# Patient Record
Sex: MALE | Race: BLACK OR AFRICAN AMERICAN | NOT HISPANIC OR LATINO
[De-identification: names, ages, dates, MRNs, and addresses within clinical notes are randomized per-mention and may not be internally consistent; named-entity substitution may affect disease eponyms.]

---

## 2020-06-19 ENCOUNTER — APPOINTMENT (OUTPATIENT)
Dept: CARDIOLOGY | Facility: CLINIC | Age: 50
End: 2020-06-19
Payer: COMMERCIAL

## 2020-06-23 PROCEDURE — 93224 XTRNL ECG REC UP TO 48 HRS: CPT

## 2020-06-25 ENCOUNTER — APPOINTMENT (OUTPATIENT)
Dept: CARDIOLOGY | Facility: CLINIC | Age: 50
End: 2020-06-25
Payer: COMMERCIAL

## 2020-06-25 VITALS
HEIGHT: 70 IN | HEART RATE: 85 BPM | OXYGEN SATURATION: 96 % | BODY MASS INDEX: 21.47 KG/M2 | WEIGHT: 150 LBS | DIASTOLIC BLOOD PRESSURE: 68 MMHG | SYSTOLIC BLOOD PRESSURE: 118 MMHG | TEMPERATURE: 98.8 F

## 2020-06-25 DIAGNOSIS — Z83.3 FAMILY HISTORY OF DIABETES MELLITUS: ICD-10-CM
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
## 2020-06-25 DIAGNOSIS — Z00.00 ENCOUNTER FOR GENERAL ADULT MEDICAL EXAMINATION W/OUT ABNORMAL FINDINGS: ICD-10-CM

## 2020-06-25 PROCEDURE — 99244 OFF/OP CNSLTJ NEW/EST MOD 40: CPT

## 2020-06-25 RX ORDER — PNV NO.95/FERROUS FUM/FOLIC AC 28MG-0.8MG
TABLET ORAL
Refills: 0 | Status: DISCONTINUED | COMMUNITY

## 2020-06-25 RX ORDER — FOLIC ACID 20 MG
CAPSULE ORAL
Refills: 0 | Status: DISCONTINUED | COMMUNITY

## 2020-06-25 NOTE — PHYSICAL EXAM
[General Appearance - Well Developed] : well developed [Normal Appearance] : normal appearance [General Appearance - Well Nourished] : well nourished [Well Groomed] : well groomed [General Appearance - In No Acute Distress] : no acute distress [No Deformities] : no deformities [Normal Conjunctiva] : the conjunctiva exhibited no abnormalities [Eyelids - No Xanthelasma] : the eyelids demonstrated no xanthelasmas [No Oral Cyanosis] : no oral cyanosis [No Oral Pallor] : no oral pallor [Normal Oral Mucosa] : normal oral mucosa [Normal Jugular Venous V Waves Present] : normal jugular venous V waves present [Heart Rate And Rhythm] : heart rate and rhythm were normal [Heart Sounds] : normal S1 and S2 [Arterial Pulses Normal] : the arterial pulses were normal [Murmurs] : no murmurs present [Edema] : no peripheral edema present [Respiration, Rhythm And Depth] : normal respiratory rhythm and effort [FreeTextEntry1] : No midsystolic click [Abdomen Soft] : soft [Exaggerated Use Of Accessory Muscles For Inspiration] : no accessory muscle use [Auscultation Breath Sounds / Voice Sounds] : lungs were clear to auscultation bilaterally [Abdomen Tenderness] : non-tender [Gait - Sufficient For Exercise Testing] : the gait was sufficient for exercise testing [Abnormal Walk] : normal gait [Skin Color & Pigmentation] : normal skin color and pigmentation [Cyanosis, Localized] : no localized cyanosis [Nail Clubbing] : no clubbing of the fingernails [] : no rash [Skin Turgor] : normal skin turgor [Oriented To Time, Place, And Person] : oriented to person, place, and time [Mood] : the mood was normal [Affect] : the affect was normal [No Anxiety] : not feeling anxious

## 2020-06-25 NOTE — ASSESSMENT
[FreeTextEntry1] : Reviewed today:\par -EKG 6/18/2020: Sinus rhythm, unremarkable.\par -Holter recording 6/19/2020: Sinus rhythm.  Average heart rate 89.  Rare PACs and rare PVCs.  No symptoms\par

## 2020-06-25 NOTE — HISTORY OF PRESENT ILLNESS
[FreeTextEntry1] : Jj is a pleasant 50-year-old male with history of anxiety and remote history of mitral valve prolapse.  In the past month, he has had 2 episodes of waking up from the sleep with hyperventilation and rapid palpitations. \par \par  On the last occasion about 3 to 4 weeks ago, he went to the ER where he was checked out and was told that his heart was okay.\par \par Past history of palpitations with lightheadedness or syncope.  He is fairly active and does not have exertional chest pain or shortness of breath.  He denies PND, sleep apnea, pedal edema\par \par He does not abuse drugs or alcohol

## 2020-06-25 NOTE — DISCUSSION/SUMMARY
[FreeTextEntry1] : Echocardiogram should be performed to evaluate history of MVP.  Sometimes, they have higher incidence of palpitations.\par \par It seems that his rapid palpitations and hyperventilation are secondary to anxiety.  I have asked him to follow this up with primary care or seek help of psychiatry.\par \par I have taught him how to take his pulse.  Next time He has rapid palpitations, he should measure his heart rate.\par \par ETT in future\par \par We will obtain recent labs that were done on him including fasting lipid profile and TSH and electrolytes\par \par Thank you for this referral and allowing me to participate in the care of this patient.  If can be of any further help or  if you have any questions, please do not hesitate to contact me\par \par \par Sincerely,\par \par Gian Fernandez MD, FAC, BEV

## 2020-07-31 ENCOUNTER — APPOINTMENT (OUTPATIENT)
Dept: CARDIOLOGY | Facility: CLINIC | Age: 50
End: 2020-07-31
Payer: COMMERCIAL

## 2020-07-31 ENCOUNTER — TRANSCRIPTION ENCOUNTER (OUTPATIENT)
Age: 50
End: 2020-07-31

## 2020-07-31 PROCEDURE — 93306 TTE W/DOPPLER COMPLETE: CPT

## 2020-08-03 ENCOUNTER — APPOINTMENT (OUTPATIENT)
Dept: CARDIOLOGY | Facility: CLINIC | Age: 50
End: 2020-08-03
Payer: COMMERCIAL

## 2020-08-03 ENCOUNTER — NON-APPOINTMENT (OUTPATIENT)
Age: 50
End: 2020-08-03

## 2020-08-03 VITALS
HEIGHT: 70 IN | SYSTOLIC BLOOD PRESSURE: 110 MMHG | WEIGHT: 153 LBS | OXYGEN SATURATION: 95 % | BODY MASS INDEX: 21.9 KG/M2 | HEART RATE: 97 BPM | DIASTOLIC BLOOD PRESSURE: 60 MMHG

## 2020-08-03 PROCEDURE — 99214 OFFICE O/P EST MOD 30 MIN: CPT

## 2020-08-03 PROCEDURE — 93000 ELECTROCARDIOGRAM COMPLETE: CPT

## 2020-08-04 NOTE — HISTORY OF PRESENT ILLNESS
[FreeTextEntry1] : Jj is a pleasant 50-year-old male with history of anxiety and remote history of mitral valve prolapse.  In the past month, he has had 2 episodes of waking up from the sleep with hyperventilation and rapid palpitations. \par Last seen in our office on June 25, 2020 and echo and 24hr HM performed. There has been no recurrence of the symptoms of palpitations. Overall he is feeling well. Exerting himself on a regular basis with no new exertional complaints. \par He does not abuse drugs or alcohol\par \par Today he denies chest pain, pressure, unusual shortness of breath, lightheadedness, dizziness, near syncope or syncope. \par \par There is no prior history of myocardial infarction, coronary revascularization, history of ischemic heart disease, or symptomatic congestive heart failure. There is no history of symptomatic arrhythmias including atrial fibrillation. \par \par \par - Echo July 31, 2020 EF 54%, min MR\par - EKG 6/18/2020: Sinus rhythm, unremarkable.\par - Holter recording 6/19/2020: Sinus rhythm.  Average heart rate 89.  Rare PACs and rare PVCs.  No symptoms\par

## 2020-08-04 NOTE — DISCUSSION/SUMMARY
[FreeTextEntry1] : ANNALISE MCGARRY  is a 50 year M  who presents today Aug 03, 2020 with the above history and the following active issues. \par \par Palpitations - It seems that his rapid palpitations and hyperventilation are secondary to anxiety.  I have asked him to follow this up with primary care or seek help of psychiatry.\par \par At last visit ETT was recommended and patient is requesting to wait until he can do it without wearing a mask. \par \par We will obtain recent labs that were done on him including fasting lipid profile and TSH and electrolytes\par \par Red flag symptoms which would warrant sooner emergent evaluation reviewed with the patient. \par Questions and concerns were addressed and answered. \par Limitations of non-invasive testing reviewed.\par \par Sincerely,\par \par Anette Bar PA-C\par Patients history, testing and plan reviewed with supervising MD: Dr. Naye Hester

## 2020-08-04 NOTE — PHYSICAL EXAM
[General Appearance - Well Developed] : well developed [Normal Appearance] : normal appearance [Well Groomed] : well groomed [General Appearance - Well Nourished] : well nourished [No Deformities] : no deformities [General Appearance - In No Acute Distress] : no acute distress [Normal Conjunctiva] : the conjunctiva exhibited no abnormalities [No Oral Pallor] : no oral pallor [Eyelids - No Xanthelasma] : the eyelids demonstrated no xanthelasmas [Normal Oral Mucosa] : normal oral mucosa [No Oral Cyanosis] : no oral cyanosis [Normal Jugular Venous V Waves Present] : normal jugular venous V waves present [Respiration, Rhythm And Depth] : normal respiratory rhythm and effort [Auscultation Breath Sounds / Voice Sounds] : lungs were clear to auscultation bilaterally [Heart Rate And Rhythm] : heart rate and rhythm were normal [Exaggerated Use Of Accessory Muscles For Inspiration] : no accessory muscle use [Murmurs] : no murmurs present [Heart Sounds] : normal S1 and S2 [Arterial Pulses Normal] : the arterial pulses were normal [Edema] : no peripheral edema present [Abnormal Walk] : normal gait [Abdomen Soft] : soft [Abdomen Tenderness] : non-tender [Nail Clubbing] : no clubbing of the fingernails [Gait - Sufficient For Exercise Testing] : the gait was sufficient for exercise testing [Cyanosis, Localized] : no localized cyanosis [Skin Color & Pigmentation] : normal skin color and pigmentation [] : no rash [Skin Turgor] : normal skin turgor [Oriented To Time, Place, And Person] : oriented to person, place, and time [Affect] : the affect was normal [Mood] : the mood was normal [No Anxiety] : not feeling anxious [FreeTextEntry1] : No midsystolic click

## 2021-02-05 ENCOUNTER — APPOINTMENT (OUTPATIENT)
Dept: CARDIOLOGY | Facility: CLINIC | Age: 51
End: 2021-02-05
Payer: COMMERCIAL

## 2021-02-05 VITALS
SYSTOLIC BLOOD PRESSURE: 90 MMHG | HEIGHT: 70 IN | DIASTOLIC BLOOD PRESSURE: 70 MMHG | HEART RATE: 98 BPM | BODY MASS INDEX: 22.05 KG/M2 | TEMPERATURE: 97.8 F | WEIGHT: 154 LBS | OXYGEN SATURATION: 99 %

## 2021-02-05 PROCEDURE — 99214 OFFICE O/P EST MOD 30 MIN: CPT

## 2021-02-05 PROCEDURE — 99072 ADDL SUPL MATRL&STAF TM PHE: CPT

## 2021-02-05 NOTE — DISCUSSION/SUMMARY
[FreeTextEntry1] : ANNALISE MCGARRY  is a 50 year M \par \par Palpitations - It seems that his rapid palpitations in the past were  secondary to anxiety.  This has improved.\par \par Patient is anxious.  Elevated resting heart rate.  Also cigarette smoking could have contributed.  We will recheck EKG after few days to get a baseline heart rate.  The patient will then decide regarding ETT which is still pending.\par \par Increase oral intake of fluid and salt.  He has asymptomatic borderline low blood pressures.  He does not have any dizziness or lightheadedness.\par \par We will obtain recent labs that were done on him including fasting lipid profile and TSH and electrolytes\par \par Red flag symptoms which would warrant sooner emergent evaluation reviewed with the patient. \par Questions and concerns were addressed and answered. \par \par \par Thank you for this referral and allowing me to participate in the care of this patient.  If I can be of any further help or  if you have any questions, please do not hesitate to contact me\par \par \par Sincerely,\par \par Gian Fernandez MD, FACC, BEV

## 2021-02-05 NOTE — HISTORY OF PRESENT ILLNESS
[FreeTextEntry1] : jJ is a pleasant 50-year-old male with history of anxiety and remote history of mitral valve prolapse. \par \par There has been no recurrence of the symptoms of palpitations. Overall he is feeling well. Exerting himself on a regular basis with no new exertional complaints. He does not abuse drugs or alcohol but continues to smoke.\par \par Today he denies chest pain, pressure, unusual shortness of breath, lightheadedness, dizziness, near syncope or syncope. \par \par His resting blood pressure is slightly low and resting heart rate slightly high.  He just had cigarette smoke prior to coming to the office.\par \par There is no prior history of myocardial infarction, coronary revascularization, history of ischemic heart disease, or symptomatic congestive heart failure. There is no history of symptomatic arrhythmias including atrial fibrillation. \par \par \par - Echo July 31, 2020 EF 54%, min MR\par - EKG 6/18/2020: Sinus rhythm, unremarkable.\par - Holter recording 6/19/2020: Sinus rhythm.  Average heart rate 89.  Rare PACs and rare PVCs.  No symptoms\par

## 2021-02-05 NOTE — PHYSICAL EXAM
[General Appearance - Well Developed] : well developed [Normal Appearance] : normal appearance [Well Groomed] : well groomed [General Appearance - Well Nourished] : well nourished [No Deformities] : no deformities [General Appearance - In No Acute Distress] : no acute distress [Normal Conjunctiva] : the conjunctiva exhibited no abnormalities [Eyelids - No Xanthelasma] : the eyelids demonstrated no xanthelasmas [Normal Oral Mucosa] : normal oral mucosa [No Oral Pallor] : no oral pallor [No Oral Cyanosis] : no oral cyanosis [Normal Jugular Venous V Waves Present] : normal jugular venous V waves present [Respiration, Rhythm And Depth] : normal respiratory rhythm and effort [Exaggerated Use Of Accessory Muscles For Inspiration] : no accessory muscle use [Auscultation Breath Sounds / Voice Sounds] : lungs were clear to auscultation bilaterally [Heart Rate And Rhythm] : heart rate and rhythm were normal [Heart Sounds] : normal S1 and S2 [Murmurs] : no murmurs present [Arterial Pulses Normal] : the arterial pulses were normal [Edema] : no peripheral edema present [FreeTextEntry1] : No midsystolic click [Abdomen Soft] : soft [Abdomen Tenderness] : non-tender [Abnormal Walk] : normal gait [Gait - Sufficient For Exercise Testing] : the gait was sufficient for exercise testing [Nail Clubbing] : no clubbing of the fingernails [Cyanosis, Localized] : no localized cyanosis [Skin Color & Pigmentation] : normal skin color and pigmentation [Skin Turgor] : normal skin turgor [] : no rash [Oriented To Time, Place, And Person] : oriented to person, place, and time [Affect] : the affect was normal [Mood] : the mood was normal [No Anxiety] : not feeling anxious

## 2021-02-12 ENCOUNTER — APPOINTMENT (OUTPATIENT)
Dept: CARDIOLOGY | Facility: CLINIC | Age: 51
End: 2021-02-12
Payer: COMMERCIAL

## 2021-02-12 ENCOUNTER — NON-APPOINTMENT (OUTPATIENT)
Age: 51
End: 2021-02-12

## 2021-02-12 VITALS
WEIGHT: 156 LBS | HEART RATE: 82 BPM | SYSTOLIC BLOOD PRESSURE: 100 MMHG | HEIGHT: 70 IN | OXYGEN SATURATION: 96 % | BODY MASS INDEX: 22.33 KG/M2 | DIASTOLIC BLOOD PRESSURE: 64 MMHG

## 2021-02-12 DIAGNOSIS — R00.2 PALPITATIONS: ICD-10-CM

## 2021-02-12 DIAGNOSIS — F17.200 NICOTINE DEPENDENCE, UNSPECIFIED, UNCOMPLICATED: ICD-10-CM

## 2021-02-12 DIAGNOSIS — I34.1 NONRHEUMATIC MITRAL (VALVE) PROLAPSE: ICD-10-CM

## 2021-02-12 PROCEDURE — 93000 ELECTROCARDIOGRAM COMPLETE: CPT

## 2021-02-12 PROCEDURE — 99072 ADDL SUPL MATRL&STAF TM PHE: CPT

## 2021-02-12 PROCEDURE — 99214 OFFICE O/P EST MOD 30 MIN: CPT

## 2021-02-12 NOTE — PHYSICAL EXAM
[General Appearance - Well Developed] : well developed [Normal Appearance] : normal appearance [Well Groomed] : well groomed [General Appearance - Well Nourished] : well nourished [No Deformities] : no deformities [General Appearance - In No Acute Distress] : no acute distress [Normal Conjunctiva] : the conjunctiva exhibited no abnormalities [Eyelids - No Xanthelasma] : the eyelids demonstrated no xanthelasmas [No Oral Pallor] : no oral pallor [No Oral Cyanosis] : no oral cyanosis [Normal Jugular Venous V Waves Present] : normal jugular venous V waves present [Respiration, Rhythm And Depth] : normal respiratory rhythm and effort [Exaggerated Use Of Accessory Muscles For Inspiration] : no accessory muscle use [Auscultation Breath Sounds / Voice Sounds] : lungs were clear to auscultation bilaterally [Heart Rate And Rhythm] : heart rate and rhythm were normal [Heart Sounds] : normal S1 and S2 [Murmurs] : no murmurs present [Arterial Pulses Normal] : the arterial pulses were normal [Edema] : no peripheral edema present [FreeTextEntry1] : No midsystolic click [Abdomen Soft] : soft [Abdomen Tenderness] : non-tender [Abnormal Walk] : normal gait [Gait - Sufficient For Exercise Testing] : the gait was sufficient for exercise testing [Nail Clubbing] : no clubbing of the fingernails [Cyanosis, Localized] : no localized cyanosis [Skin Color & Pigmentation] : normal skin color and pigmentation [Skin Turgor] : normal skin turgor [] : no rash [Oriented To Time, Place, And Person] : oriented to person, place, and time [Affect] : the affect was normal [Mood] : the mood was normal [No Anxiety] : not feeling anxious

## 2021-02-12 NOTE — DISCUSSION/SUMMARY
[FreeTextEntry1] : ANNALISE MCGARRY  is a 50 year M here today for EKG d/t recent episode of tachycardia. \par \par Palpitations - It seems that his rapid palpitations in the past were  secondary to anxiety.  This has improved.\par \par Patient is anxious.  Elevated resting heart rate at last visit has imporved today, cigarette smoking was likely a contributory factor. Recommend ETT to assess CV response to exercise. No smoking prior to testing. \par \par Extensive counseling regarding smoking cessation and long term CVD risk. Pt verbalizes understanding but is unmotivated to quit. He declines referral to cessation program. He will consider in the future and discuss with PCP. \par \par Increase oral intake of fluid and salt.  He has asymptomatic borderline low blood pressures.  He does not have any dizziness or lightheadedness.\par \par I advised him to see his PCP for annual labs including lipids, TSH, electrolytes, A1c. \par \par Will call to review ETT and annual cardiovascular followup thereafter. \par Any questions and concerns were addressed and resolved. \par \par Sincerely,\par \par VIRIDIANA Sy\par Patients history, testing, and plan reviewed with supervising MD: Dr. Gian Fernandez

## 2021-03-15 ENCOUNTER — APPOINTMENT (OUTPATIENT)
Dept: CARDIOLOGY | Facility: CLINIC | Age: 51
End: 2021-03-15

## 2021-06-26 ENCOUNTER — APPOINTMENT (OUTPATIENT)
Dept: DISASTER EMERGENCY | Facility: CLINIC | Age: 51
End: 2021-06-26

## 2021-06-26 DIAGNOSIS — Z01.818 ENCOUNTER FOR OTHER PREPROCEDURAL EXAMINATION: ICD-10-CM

## 2021-06-27 LAB — SARS-COV-2 N GENE NPH QL NAA+PROBE: NOT DETECTED

## 2021-06-29 ENCOUNTER — OUTPATIENT (OUTPATIENT)
Dept: OUTPATIENT SERVICES | Facility: HOSPITAL | Age: 51
LOS: 1 days | End: 2021-06-29

## 2021-07-31 ENCOUNTER — APPOINTMENT (OUTPATIENT)
Dept: DISASTER EMERGENCY | Facility: CLINIC | Age: 51
End: 2021-07-31

## 2021-08-01 LAB — SARS-COV-2 N GENE NPH QL NAA+PROBE: NOT DETECTED

## 2021-08-03 ENCOUNTER — OUTPATIENT (OUTPATIENT)
Dept: OUTPATIENT SERVICES | Facility: HOSPITAL | Age: 51
LOS: 1 days | End: 2021-08-03

## 2023-05-09 NOTE — HISTORY OF PRESENT ILLNESS
See Physician's progress note. [FreeTextEntry1] : Jj is a pleasant 50-year-old male with history of anxiety and remote history of mitral valve prolapse. \par \par There has been no recurrence of the symptoms of palpitations. Overall he is feeling well. Exerting himself on a regular basis with no new exertional complaints. He does not abuse drugs or alcohol but continues to smoke.\par \par Today he denies chest pain, pressure, unusual shortness of breath, lightheadedness, dizziness, near syncope or syncope. \par \par His resting blood pressure is slightly low and resting heart rate slightly high at office visit with Dr. Fernandez recently.  He just had cigarette smoke prior to coming to the office. Today his BP remains borderline low, asymptomatic for this. His HR 70s and NSR by EKG with no significant abnormality. \par \par He is not motivated to quite smoking. \par \par [facility] at [facility]\par \par There is no prior history of myocardial infarction, coronary revascularization, history of ischemic heart disease, or symptomatic congestive heart failure. There is no history of symptomatic arrhythmias including atrial fibrillation. \par \par - EKG 2/12/21 NSR, unremarkable\par - Echo July 31, 2020 EF 54%, min MR\par - EKG 6/18/2020: Sinus rhythm, unremarkable.\par - Holter recording 6/19/2020: Sinus rhythm.  Average heart rate 89.  Rare PACs and rare PVCs.  No symptoms\par

## 2023-10-02 ENCOUNTER — RX ONLY (RX ONLY)
Age: 53
End: 2023-10-02

## 2023-10-02 ENCOUNTER — OFFICE (OUTPATIENT)
Dept: URBAN - METROPOLITAN AREA CLINIC 8 | Facility: CLINIC | Age: 53
Setting detail: OPHTHALMOLOGY
End: 2023-10-02
Payer: OTHER GOVERNMENT

## 2023-10-02 DIAGNOSIS — H16.223: ICD-10-CM

## 2023-10-02 DIAGNOSIS — H25.13: ICD-10-CM

## 2023-10-02 DIAGNOSIS — H02.834: ICD-10-CM

## 2023-10-02 DIAGNOSIS — H02.831: ICD-10-CM

## 2023-10-02 PROCEDURE — 92004 COMPRE OPH EXAM NEW PT 1/>: CPT | Performed by: OPHTHALMOLOGY

## 2023-10-02 ASSESSMENT — SPHEQUIV_DERIVED
OS_SPHEQUIV: 1.125
OS_SPHEQUIV: 0.875
OS_SPHEQUIV: 1.375
OD_SPHEQUIV: 0.75
OD_SPHEQUIV: 1.25
OD_SPHEQUIV: 0.75

## 2023-10-02 ASSESSMENT — KERATOMETRY
OS_K1POWER_DIOPTERS: 43.75
OD_AXISANGLE_DEGREES: 088
OS_K2POWER_DIOPTERS: 44.50
METHOD_AUTO_MANUAL: AUTO
OS_AXISANGLE_DEGREES: 069
OD_K2POWER_DIOPTERS: 44.50
OD_K1POWER_DIOPTERS: 43.50

## 2023-10-02 ASSESSMENT — REFRACTION_MANIFEST
OS_AXIS: 135
OD_VA1: 20/20-
OS_SPHERE: +1.00
OD_CYLINDER: -0.50
OS_SPHERE: +1.25
OD_ADD: +1.00
OU_VA: 20/20
OD_CYLINDER: -0.50
OS_VA1: 20/20
OD_AXIS: 030
OS_CYLINDER: -0.25
OS_ADD: +1.50
OS_AXIS: 135
OD_AXIS: 030
OS_ADD: +1.00
OS_CYLINDER: -0.25
OS_VA1: 20/20
OD_VA1: 20/20-
OD_SPHERE: +1.00
OD_ADD: +1.50
OU_VA: 20/20
OD_SPHERE: +1.00

## 2023-10-02 ASSESSMENT — AXIALLENGTH_DERIVED
OD_AL: 23.1255
OD_AL: 23.1255
OS_AL: 22.9424
OS_AL: 22.8507
OD_AL: 22.9398
OS_AL: 23.0349

## 2023-10-02 ASSESSMENT — TEAR BREAK UP TIME (TBUT)
OD_TBUT: 8 SEC
OS_TBUT: 8 SEC

## 2023-10-02 ASSESSMENT — REFRACTION_AUTOREFRACTION
OD_CYLINDER: -0.50
OS_AXIS: 133
OS_CYLINDER: -0.75
OD_SPHERE: +1.50
OS_SPHERE: +1.75
OD_AXIS: 030

## 2023-10-02 ASSESSMENT — SUPERFICIAL PUNCTATE KERATITIS (SPK)
OD_SPK: T
OS_SPK: T

## 2023-10-02 ASSESSMENT — VISUAL ACUITY
OS_BCVA: 20/25-2
OD_BCVA: 20/40

## 2023-10-02 ASSESSMENT — CONFRONTATIONAL VISUAL FIELD TEST (CVF)
OD_FINDINGS: FULL
OS_FINDINGS: FULL

## 2023-10-02 ASSESSMENT — LID POSITION - DERMATOCHALASIS
OS_DERMATOCHALASIS: LUL 2+
OD_DERMATOCHALASIS: RUL 2+

## 2024-10-02 ENCOUNTER — OFFICE (OUTPATIENT)
Dept: URBAN - METROPOLITAN AREA CLINIC 8 | Facility: CLINIC | Age: 54
Setting detail: OPHTHALMOLOGY
End: 2024-10-02

## 2024-10-02 DIAGNOSIS — E78.2: ICD-10-CM

## 2024-10-02 PROCEDURE — NO SHOW FE NO SHOW FEE: Performed by: OPHTHALMOLOGY
